# Patient Record
Sex: FEMALE | Race: WHITE | Employment: FULL TIME | URBAN - METROPOLITAN AREA
[De-identification: names, ages, dates, MRNs, and addresses within clinical notes are randomized per-mention and may not be internally consistent; named-entity substitution may affect disease eponyms.]

---

## 2024-06-08 ENCOUNTER — HOSPITAL ENCOUNTER (EMERGENCY)
Age: 54
Discharge: HOME | End: 2024-06-08
Attending: EMERGENCY MEDICINE
Payer: COMMERCIAL

## 2024-06-08 VITALS
HEART RATE: 68 BPM | DIASTOLIC BLOOD PRESSURE: 72 MMHG | SYSTOLIC BLOOD PRESSURE: 135 MMHG | WEIGHT: 170 LBS | OXYGEN SATURATION: 98 % | HEIGHT: 67 IN | BODY MASS INDEX: 26.68 KG/M2 | RESPIRATION RATE: 16 BRPM | TEMPERATURE: 98.2 F

## 2024-06-08 DIAGNOSIS — H11.31 SUBCONJUNCTIVAL BLEED, RIGHT: ICD-10-CM

## 2024-06-08 DIAGNOSIS — S05.01XA ABRASION OF RIGHT CORNEA, INITIAL ENCOUNTER: Primary | ICD-10-CM

## 2024-06-08 PROCEDURE — 90471 IMMUNIZATION ADMIN: CPT | Performed by: PHYSICIAN ASSISTANT

## 2024-06-08 PROCEDURE — 99284 EMERGENCY DEPT VISIT MOD MDM: CPT

## 2024-06-08 PROCEDURE — 90714 TD VACC NO PRESV 7 YRS+ IM: CPT | Performed by: PHYSICIAN ASSISTANT

## 2024-06-08 PROCEDURE — 6360000002 HC RX W HCPCS: Performed by: PHYSICIAN ASSISTANT

## 2024-06-08 PROCEDURE — 6370000000 HC RX 637 (ALT 250 FOR IP)

## 2024-06-08 RX ORDER — TOBRAMYCIN 3 MG/ML
1 SOLUTION/ DROPS OPHTHALMIC EVERY 4 HOURS
Qty: 5 ML | Refills: 0 | Status: SHIPPED | OUTPATIENT
Start: 2024-06-08 | End: 2024-06-18

## 2024-06-08 RX ADMIN — FLUORESCEIN SODIUM 1 MG: 1 STRIP OPHTHALMIC at 16:19

## 2024-06-08 RX ADMIN — CLOSTRIDIUM TETANI TOXOID ANTIGEN (FORMALDEHYDE INACTIVATED) AND CORYNEBACTERIUM DIPHTHERIAE TOXOID ANTIGEN (FORMALDEHYDE INACTIVATED) 0.5 ML: 5; 2 INJECTION, SUSPENSION INTRAMUSCULAR at 16:19

## 2024-06-08 ASSESSMENT — LIFESTYLE VARIABLES
HOW MANY STANDARD DRINKS CONTAINING ALCOHOL DO YOU HAVE ON A TYPICAL DAY: 1 OR 2
HOW OFTEN DO YOU HAVE A DRINK CONTAINING ALCOHOL: MONTHLY OR LESS

## 2024-06-08 ASSESSMENT — VISUAL ACUITY
OD: 20/30
OU: 20/13
OS: 20/20

## 2024-06-08 ASSESSMENT — PAIN DESCRIPTION - FREQUENCY: FREQUENCY: CONTINUOUS

## 2024-06-08 ASSESSMENT — PAIN DESCRIPTION - LOCATION: LOCATION: EAR

## 2024-06-08 ASSESSMENT — PAIN DESCRIPTION - DESCRIPTORS: DESCRIPTORS: DISCOMFORT

## 2024-06-08 ASSESSMENT — PAIN SCALES - GENERAL
PAINLEVEL_OUTOF10: 1
PAINLEVEL_OUTOF10: 1

## 2024-06-08 ASSESSMENT — PAIN DESCRIPTION - PAIN TYPE: TYPE: ACUTE PAIN

## 2024-06-08 ASSESSMENT — PAIN - FUNCTIONAL ASSESSMENT: PAIN_FUNCTIONAL_ASSESSMENT: 0-10

## 2024-06-08 ASSESSMENT — PAIN DESCRIPTION - ORIENTATION: ORIENTATION: RIGHT

## 2024-06-08 NOTE — ED PROVIDER NOTES
Shared MIKE-ED Attending Visit.  CC: No               [facility] EMERGENCY DEPARTMENT  ED  Encounter Note  Admit Date/RoomTime: 2024  3:31 PM  ED Room:   NAME: Xiomy Benton  : 1970  MRN: [id number]  PCP: Ana Cordova DO    CHIEF COMPLAINT     Eye Injury (Right eye injury while using weed arian; eye has broken blood vessel)    HISTORY OF PRESENT ILLNESS        Xiomy Benton is a 53 y.o. female who presents to the ED by private vehicle for injury right eye while weed whacking, beginning a few hour(s) ago. The complaint has been persistent and are mild in severity.  The patient admits she was not wearing any safety goggles.  She felt something fly up and into her right eye.  The patient has mild discomfort at the site and noted bright red blood in the medial portion of the eye.  She does wear glasses but did not have anything on while doing her yard work.  No contacts were in place.  States her vision feels fine.  She did not see any foreign matter in the eye.  Last tetanus is uncertain and likely over 10 years ago.      REVIEW OF SYSTEMS     Pertinent positives and negatives are stated within HPI, all other systems reviewed and are negative.    Past Medical History:  has no past medical history on file.  Surgical History:  has a past surgical history that includes Cholecystectomy.  Social History:  reports that she has never smoked. She does not have any smokeless tobacco history on file. She reports that she does not drink alcohol and does not use drugs.  Family History: family history is not on file.   Allergies: Patient has no known allergies.  CURRENT MEDICATIONS       Previous Medications    HYDROCHLOROTHIAZIDE (HYDRODIURIL) 25 MG TABLET    Take 25 mg by mouth daily.       SCREENINGS     [place] Coma Scale  Eye Opening: Spontaneous  Best Verbal Response: Oriented  Best Motor Response: Obeys commands  [place] Coma Scale Score: 15         JULIO CESARWA